# Patient Record
Sex: FEMALE | Race: WHITE | Employment: PART TIME | URBAN - METROPOLITAN AREA
[De-identification: names, ages, dates, MRNs, and addresses within clinical notes are randomized per-mention and may not be internally consistent; named-entity substitution may affect disease eponyms.]

---

## 2021-10-20 ENCOUNTER — TELEPHONE (OUTPATIENT)
Dept: SLEEP MEDICINE | Facility: CLINIC | Age: 71
End: 2021-10-20

## 2021-11-02 ENCOUNTER — VIRTUAL VISIT (OUTPATIENT)
Dept: SLEEP MEDICINE | Facility: CLINIC | Age: 71
End: 2021-11-02
Payer: MEDICARE

## 2021-11-02 DIAGNOSIS — F51.04 CHRONIC INSOMNIA: Primary | ICD-10-CM

## 2021-11-02 PROCEDURE — 90791 PSYCH DIAGNOSTIC EVALUATION: CPT | Mod: 95 | Performed by: PSYCHOLOGIST

## 2021-11-02 RX ORDER — CLOPIDOGREL BISULFATE 75 MG/1
75 TABLET ORAL DAILY
COMMUNITY

## 2021-11-02 RX ORDER — GOLIMUMAB 50 MG/4ML
SOLUTION INTRAVENOUS
COMMUNITY

## 2021-11-02 RX ORDER — OMEGA-3/DHA/EPA/FISH OIL 910-1400MG
CAPSULE ORAL
COMMUNITY

## 2021-11-02 RX ORDER — AMLODIPINE BESYLATE 5 MG/1
5 TABLET ORAL DAILY
COMMUNITY

## 2021-11-02 NOTE — PROGRESS NOTES
Are you currently in the state of [place]: Yes   Cira is a 71 year old who is being evaluated via a billable video visit.      How would you like to obtain your AVS? MyChart  If the video visit is dropped, the invitation should be resent by: Text to cell phone: [phone]  Will anyone else be joining your video visit? Trupti Todd MA    Video Start Time: 3:44 PM  Video-Visit Details    Type of service:  Video Visit    Video End Time:3:56 PM    Originating Location (pt. Location): Home    Distant Location (provider location):  [facility]     Platform used for Video Visit: [employer]     SLEEP MEDICINE CONSULTATION  Sleep Psychology    Name: Cira Boyle MRN# [id number]   Age: 71 year old YOB: 1950     Date of Consultation: Nov 2, 2021  Consultation is requested by: No referring provider defined for this encounter.  Primary care provider: Idalia Green    Reason for Sleep Consultation     Cira Boyle is a 71 year old female seen today for a behavioral sleep medicine consultation because of insomnia    Assessment and Plan     Sleep Diagnoses/Recommendations:    Chronic insomnia    Cira Boyle was seen for sleep psychology consultation and cognitive behavioral treatment of psychophysiologic insomnia.  She is a very suitable candidate for treatment.  Cooccurring medical issues appear to be generally well managed and there are clear behavioral targets for CBT-I  .  The primary intervention will be sleep compression and reduction of average time in bed from 9.5 hours to 7 hours..  This is based on sleep diary data and self-report of average sleep time of 6 hours or less.  A second area of his to address sleep specific, worry and maladaptive beliefs about sleep.  There are, there is variation in sleep offset.    Patient will initiate a 7-hour sleep window between midnight-7 AM.  She will ensure that she gets bright ambient light in the morning for at least 30-60 minutes.   She will get off of screens and devices 2 hours before bed and engage in a relaxing bedtime routine going to bed only when sleepy.  She will discontinue use of her wearable device as this appears to be increasing sleep apprehension.    Summary Counseling:      Cira was provided information about the pathophysiology of insomnia, psychophysiological factors contributing to the onset and maintenance of insomnia and how co-occurring medical conditions and intrinsic sleep disorders can affect sleep.  Treatment options were discussed including component of cognitive-behavioral therapy for insomnia as applicable to patient specific sleep concerns and symptoms. The benefits and potential early side effects of treatment including increased daytime sleepiness were discussed.     Patient was advised to consult with their prescribing provider around use of or changes to prescription sleep medication.  Patient was counseled on the importance of avoiding driving if drowsy.    See patient instructions for initial treatment recommendations and behavioral sleep plan.    Follow-up: 2 months     History of Present Sleep Complaint     Cira Boyle is a 71 year old year old female with a relevant medical history of  Chronic pain, quadruple bipass surgery, RA, GERD who presents with  A history of insomnia and non-restorative sleep.  She has had oximetry study with Dr. Julio Ji, [place] which was normal in 2020.    She has tried a number of sleep medications incuding trazodone, OTC melatonin, gabapentin,  Zaleplon, doxepin, eszopiclone and amitriptyline all within the past 4 hears.   She has used a wearable [employer] device and cites it tells her she gets only 4 hours of sleep. .    Sleep/Wake Pattern:    Shift Work - No  Source of Sleep Estimates:  verbal self-report    Time to Bed:  10:30 PM  Activity in Bed (stimulus control): none reported  Sleep Latency: 120-180 minutes  Times awakened:  2-3  Total Time Awake After Sleep Onset: 60  minutes  Time Up for the Day: 7:30 am - 8 am  Total Time in Bed: 9.5 hours  Estimated Total Sleep Time: Less than 6 hours  Sleep Efficiency Estimate: Approximately 60-70%    Naps: inadvertent naps , nap in evening    Sleep Hygiene:    Behavior affecting Sleep:  extend time in bed longer if after poor night of sleep, worry or think about sleep during the day    Environment:  Bedroom is quiet, comfortable and dark    Substance Use:  Caffeine: 1-2 beverages, does not drink caffeine within 6 hours of bedtime      Alcohol: Rare      Nicotine: None      Recreational/Illicit Drugs: None reported    Previous Sleep Studies/Consultations:    Patient did have oximetry in 2017 which was normal.  She is also been followed by the [facility] sleep center and  for management of insomnia and has been on multiple sedative-hypnotic medications.    Screening     EPWORTH SLEEPINESS SCALE    Sitting and reading 1   Watching TV 1   Sitting, inactive in a public place (theatre or mtg.) 0    As a passenger in a car 1   Lying down to rest in the afternoon when circumstance permit 1   Sitting and talking to someone 0   Sitting quietly after lunch without alcohol 0   In a car, while stopped for a few minutes in traffic 0   TOTAL SCORE (nl <11) 4     INSOMNIA SEVERITY INDEX     Difficulty falling asleep 3   Difficult staying asleep 3   Problems waking up to early 0   How SATISFIED/DISSATISFIED are you with your CURRENT sleep pattern? 4   How NOTICEABLE to others do you think your sleep pattern is in terms of your quality of life? 1   How WORRIED/DISTRESSED are you about your current sleep pattern? 4   To what extent do you consider your sleep problem to INTERFERE with your daily fuctioning(e.g. daytime fatigue, mood, ability to function at work/daily chores, concentration, mood,etc.) CURRENTLY? 1   INSOMNIA SEVERITY INDEX TOTAL SCORE 16   Absence of insomnia (0-7); sub-threshold insomnia (8-14); moderate insomnia (15-21); and severe  insomnia (22-28)    No flowsheet data found.     No flowsheet data found.       Vitals     There were no vitals taken for this visit.     Medical History     Patient Active Problem List   Diagnosis     Rheumatoid arthritis (H)     Rheumatoid arthritis, adult (H)        Current Outpatient Medications   Medication Sig Dispense Refill     acetaminophen (TYLENOL) 500 MG tablet Take by mouth daily as needed        amLODIPine (NORVASC) 5 MG tablet Take 5 mg by mouth daily       Ascorbic Acid (VITAMIN C PO) Take  by mouth.       ASPIRIN PO Take 81 mg by mouth daily.       Atorvastatin Calcium (LIPITOR PO) Take 40 mg by mouth        Calcium Carbonate-Vitamin D (CALCIUM PLUS VITAMIN D PO) Take  by mouth.       clopidogrel (PLAVIX) 75 MG tablet Take 75 mg by mouth daily       golimumab (SIMPONI ARIA) 50 MG/4ML injection every 8 weeks       Metoprolol Succinate (TOPROL XL PO) Take 12.5 mg by mouth daily        multivitamin, therapeutic with minerals (MULTI-VITAMIN) TABS Take 1 tablet by mouth daily.       Nitroglycerin (NITROSTAT SL) Place 0.4 mg under the tongue.       Omega-3 1400 MG CAPS        Pantoprazole Sodium (PROTONIX PO) Take 20 mg by mouth every morning (before breakfast)        Ranolazine (RANEXA PO) Take 500 mg by mouth 2 times daily        VITAMIN D, CHOLECALCIFEROL, PO Take  by mouth.         Past Surgical History:   Procedure Laterality Date     ARTHRODESIS ANKLE  6/20/2013    Procedure: ARTHRODESIS ANKLE;  RIGHT RHEUMATOID FOREFOOT RECONSTRUCTION WITH INTRAMEDULLARY PIN REMOVAL LEFT FOOT ();  Surgeon: Rehan Croft MD;  Location:  OR     ARTHRODESIS FOOT  5/24/2013    Procedure: ARTHRODESIS FOOT;  LEFT RHEUMATOID FOREFOOT RECONSTRUCTION   ;  Surgeon: Rehan Croft MD;  Location:  OR     BUNIONECTOMY       BYPASS GRAFT ARTERY CORONARY       CARDIAC SURGERY      angiogram, stents     GYN SURGERY      ovarian cyst removal     REMOVE HARDWARE FOOT  6/20/2013    Procedure: REMOVE HARDWARE  FOOT;;  Surgeon: Rehan Croft MD;  Location: [facility] OR          Allergies   Allergen Reactions     Rosuvastatin Other (See Comments)     MYALGIA     Erythromycin Nausea     Imdur [Isosorbide]      SEVERE HEADACHE     Levaquin      ARM PAIN AND STIFFNESS     Penicillins Rash     Ancef given with no complications     Sulfa Drugs Rash     Tizanidine Rash       Mental Health History     Prior Mental Health Diagnosis: None reported    Current Mental Health Treatment: None reported    Chemical Abuse/Treatment:  None reported      Family History     No family history on file.    Family History of Sleep Disorders: None reported    Social History     Social History     Socioeconomic History     Marital status:      Spouse name: None     Number of children: None     Years of education: None     Highest education level: None   Occupational History     None   Tobacco Use     Smoking status: Never Smoker     Smokeless tobacco: Never Used   Substance and Sexual Activity     Alcohol use: Yes     Comment: 2-3 per week     Drug use: No     Sexual activity: None   Other Topics Concern     None   Social History Narrative     None     Social Determinants of Health     Financial Resource Strain:      Difficulty of Paying Living Expenses:    Food Insecurity:      Worried About Running Out of Food in the Last Year:      Ran Out of Food in the Last Year:    Transportation Needs:      Lack of Transportation (Medical):      Lack of Transportation (Non-Medical):    Physical Activity:      Days of Exercise per Week:      Minutes of Exercise per Session:    Stress:      Feeling of Stress :    Social Connections:      Frequency of Communication with Friends and Family:      Frequency of Social Gatherings with Friends and Family:      Attends [employer] Services:      Active Member of Clubs or Organizations:      Attends Club or Organization Meetings:      Marital Status:    Intimate Partner Violence:      Fear of Current or Ex-Partner:       Emotionally Abused:      Physically Abused:      Sexually Abused:        Patient is retired, lives with      Mental Status Examination     Cira presented as oriented X3 with speech and language intact.  The patient was cooperative throughout the evaluation with no signs of hallucinations, delusions, loosening of associations or other thought disturbance.  Mood was normal Affect was congruent with mood. Insight and judgement were intact.  Memory appeared intact for recent and remote elements.  There was no report of suicidal ideation, intention or plan. Attention and concentration were within normal.      Copy:   Idalia Green               No referring provider defined for this encounter.    Brennan Echevarria PsyD, LP, [facility]  Diplomate, Behavioral Sleep Medicine  [facility]    Note: This dictation was created using voice recognition software. This document may contain an error not identified before finalizing the document. If the error changes the accuracy of the document, I would appreciate it being brought to my attention.

## 2021-11-02 NOTE — PATIENT INSTRUCTIONS
CBT-I Introductory Module    Understanding Sleep and Insomnia    Most people have trouble sleeping at some point in their life.   Chronic insomnia means you have had trouble falling asleep and/or staying asleep for at least the past three months.  Despite allowing enough time for sleep, it is affecting how you feel. You are not alone.  It is estimated that 10-15% of adults experience chronic insomnia.     Cognitive Behavioral Therapy for Insomnia (CBT-I)    Consistent with the guidelines of the [employer], [facility] recommends  Cognitive Behavioral Therapy for Insomnia (CBT-I) as the first-line treatment for insomnia.  CBT-I targets factors that lead to long-term insomnia:    ? Behavioral Conditioning    When you lay awake in bed over many nights, your body actually becomes trained or 'conditioned' to be awake during the night.  It makes the bed associated with alertness instead of sleepiness.    ? Habits that weaken your body's sleep drive and circadian sleep rhythm    Changing sleep schedules, extended time in bed, using mobile devices and computers close to bedtime, and naps too late in the day can harm your sleep at night.    ? Emotional and Physical Arousal    Things such as worrying about sleep, stress, drinking too much caffeine, and not winding down before bed can interfere with your body's natural sleep drive.    Understanding Sleep and Insomnia    [facility] CBT-I is a four-part program that teaches you the skills needed for a better night's sleep. The first step in your program is learning a bit about sleep and insomnia.      The Basics of Sleep    How does sleep help us?    Sleep, like food and water, is something we need every day. The purpose of sleep is still not exactly clear, but sleep experts agree we need consistent quality sleep to function at our best. There is evidence that sleep helps maintain brain and body functions.  It helps maintain thinking  ability and mood.  Sleep is actually a very active part of life. Sleep occurs in four stages that cycle every  minutes throughout the night. We get our deepest sleep during the first few hours of sleep.  During the last half of our sleep, we usually get the bulk of our REM (Rapid Eye Movement) sleep.  REM sleep is when most of our dreaming occurs.    How much sleep do we need?    Sleep needs vary from person to person. Most adults need between 6-8 hours of sleep.  Sleeping too little or too much may be a health risk.  As we age, most people report their sleep gets lighter, earlier, shorter, and more restless.     What Controls Our Sleep?    The three things that regulate your sleep are your sleep drive, biological clock and your arousal system (emotional and physical).  Together these make us feel alert during the day and promote sleep at night.    Your sleep drive depends on how long you have been awake. It is lowest when you first wake up.  Sleep drive gradually increases as the day goes on.  The longer time you are awake the easier it is to fall asleep.  Sleeping gradually reduces your sleep drive. That is why napping in the evening or close to bed can make it harder to sleep at night.    Your biological clock promotes wakefulness during the day and sleep at night.        Figure 1 two process sleep model (source: 1. Quita J, Daly R, Jaden T, et al. Future research directions in sleep and ADHD: report of a consensus working group. J Atten Disord. 2013;17(7):550-564. Doi:10.1177/[id number])    Understanding Insomnia    What causes insomnia?    Some people have a greater likelihood of developing insomnia due to genetics, personality or age. A host of things can trigger it: jet lag, working a different shift, medication, or the onset of a medical or mental health condition.             Insomnia and Your Arousal System    Mental activity, emotions and physical symptoms can make your brain too active to  "sleep by masking the strength of your sleep drive. Your brain's arousal system not only triggers insomnia, it plays a role in maintaining it as well.  Common sources of arousal include:    ? Worry about sleep in bed  ? An active mind concerned about unfinished tasks  ? Anxiety, Stress and Depression  ? Pain     What maintains insomnia?    Short-term insomnia can become chronic when you begin to feel fearful, worried and  on guard  about sleep loss. As you spend more time in bed or try forcing yourself to sleep your bed becomes linked with wakefulness.  This is why people with insomnia commonly report feeling tired before bed but suddenly more alert when getting into bed.  This type of  conditioning  along with unhelpful sleep habits maintains the insomnia even when the triggering event has resolved.    Tracking your Sleep    Sleep tracking is an essential part of training yourself to sleep better and monitoring your progress.  There are several ways to keep track of your sleep habits.    CBT-I  Dustin    The CBT-i  dustin is a convenient way to track your sleep on your mobile phone. We strongly recommend using it if you have a mobile phone. After downloading the free dustin, simply go to My Sleep >Sleep Diary > Add New Entry and record your entry for the day. Your entries should be based on your recollection of the past night of sleep. The dustin graphs your information and has helpful reminders. If you are working with a provider go to Settings and turn on  Working with a CBT-I Provider.   You can also e-mail a spreadsheet of your data to yourself by pressing \"[facility] User Data\" in the dustin settings. .Make sure to have your data available at the time of your first visit.      [employer] Sleep Diary     [employer] [place] has an easy to use sleep diary which includes an example of how to complete it every morning after you get up for the day. Do not watch the clock at night.  Your daily diary entry is your recollection of " your last night of sleep.       Mobile and Wearable Sleep Tracking Devices    There are many sleep tracking devices and mobile applications that estimate the timing and quantity of sleep by measuring body movement.  Though many of these devices claim to measure the depth or stages of sleep, they cannot measure brain wave activity or other measures required to determine the actual stages of sleep.  They are helpful in estimating the timing and total amount of time you sleep.    CBT-I and Sleeping Pills    Sleep medications can be helpful in the short-term but often stop working in the long-term.  Sleeping pills treat symptoms and not the underlying cause of insomnia.  They also can have side effects that last well into the day. Abruptly stopping sleeping pills can cause temporary rebound insomnia and lead to increased distress about sleeplessness.  This in turn strengthens the belief that pills are necessary for sleep.    Many patients choose to discontinue sleeping pills prior to beginning CBT-I.  You should talk to your prescribing provider before tapering or discontinuing sleep medication.            CBT-I Module - Sleep Consolidation Training      Now that you understand a bit more about how sleep works and what causes insomnia, you are ready to move on to the core of CBT-I called Sleep Consolidation Training. It will be important that you continue to keep track of your sleep using your CBTi  Dustin or your paper sleep diary.  This process involves five core strategies that will:    ? Strengthen your sleep drive and circadian sleep rhythm  ? Strengthen the link between your bed and sleep    To prepare for Sleep Consolidation Training, we recommend you make the following changes to set the stage for a better night's sleep:    ? Reduce your consumption of caffeine and alcohol.  Both can disrupt sleep and make strengthening your sleep more difficult.  Specifically:    - Avoid caffeine within 6 hours of bedtime   -  No more than 3 caffeinated beverages per day (e.g. 8 oz. cup coffee or 12 oz. cup soda)           - No alcohol within 3 hours of bedtime    ? Make sure your bedroom is quiet, comfortable and dark.  Noise, light and an uncomfortable sleep space can harm your sleep.      Core Sleep Consolidation Strategies    Much like physical activity and eating nutritious foods strengthens our body, studies show that the following core strategies are the key to achieving stronger, healthier sleep.     1. Reduce your Time In Bed    Spending extra time in bed trying to get more sleep actually can cause more sleep disruption and weaken sleep efficiency. Sleep efficiency is simply the percentage of time a person spends asleep while in bed. Normal sleep efficiency is thought to be 85% or greater.  By reducing your time in bed, you will be awake longer leading to quicker and deeper sleep. This strategy does not reduce the amount of sleep you get, just the time you are awake in bed:                                             During the first step of sleep consolidation training you will reduce your time in bed and maintain the following Sleep Schedule Prescription. Your prescription is determined by the average nightly amount of sleep you got over the past two weeks plus 30 minutes. It also takes into account the best time for you to sleep. The least amount of time prescribed is 6 hours in bed.    Your Sleep Schedule Prescription                    Total Time in Bed:  7 hours                  Bedtime:  No earlier than Midnight                   Wake-up Time:  Out of bed every day by 7 am with alarm and get exposure to bright morning light or ambient light for at least 30 minutes.     2.  Don't go to bed until you feel sleepy (not tired or fatigued)     This helps increase your sleep drive by keeping you awake longer.  If you go to bed when you're not sleepy, it gives your brain the wrong message and can lead to frustration.     If you  feel sleepy before your prescribed bedtime, find activities that can help you stay awake until it is time for you to go to bed. Even brief naps in the evening can be very disruptive of sleep at night.     3.  Don't stay in bed unless you are sleeping      If you are unable to fall asleep or return to sleep after about 30 minutes, get out of bed. This helps train your brain that the bed is for sleep. It is harmful to your sleep when you are worried or frustrated in bed. Do not read, eat, worry, think about sleep, use mobile phones or tablets, or watch TV in bed. Do not watch the clock during the night.     Go to another room and do something relaxing. Plan things you can do when you get out of bed. Avoid use of mobile devices or computers when out of bed.    Return to bed only when you feel sleepy again.  Repeat as often as needed throughout the night.     4.  Get out of bed at the same time every day    Getting up at the same time helps set your biological clock. It is important to stick to your wake time no matter how much sleep you got the night before or how you feel in the morning.    Varying your wake can have the same effect as jet lag.  It disrupts your biological clock and makes you feel more tired and exhausted.  Trying to  catch up  on sleep on the weekends only makes things worse and sets you up for a cycle of poor sleep the following weekdays.      Make sure you set an alarm and keep it away from the bed to prevent looking at the clock during the night.      5.  Avoid daytime napping    Avoid daytime napping if possible. Napping partially fulfills your need for sleep and weakens your sleep drive at night.    However, if you find yourself sleepy (not just tired) you can take a brief 15-30 minute nap during the day if needed.  Naps within 7-8 hours of your prescribed wake time are less likely to affect your sleep the coming night.  Sleepy people make more mistakes and may hurt themselves or others.  Therefore, safety trumps all other sleep prescriptions.  Never drive or operate equipment if drowsy or sleepy.     Changing Your Sleep Schedule Prescription    After two weeks, you can adjust your sleep schedule prescription based on how well you are sleeping. Use the following guidelines to change your prescribed time in bed based on information from your Sleep Diary, Mobile Sleep Diary Dustin or Wearable Device:          Increase Time in Bed by 15 Minutes IF:    ? Your CBT-I  dustin indicates your sleep efficiency has been greater than 90%.    ? OR you are falling asleep in less than 30 minutes AND awake less than 30 minutes during the night.              Decrease Time in Bed by 15 Minutes IF:    ? Your CBT-I  Dustin indicates your sleep efficiency has been less than 80%.    ? OR it is taking longer than 30 minutes to fall asleep OR you are awake more than 30 minutes during the night.      ? DO NOT decrease your sleep schedule below 6 hours unless instructed by your provider.    Change is Challenging    Research shows that making significant changes in sleep habits improves sleep quality and how you feel. Improvement takes time and is not always steady. Change is challenging and can be stressful.  This is especially true if old habits - like spending more time in bed -- were a way to avoid worry about getting enough sleep.           CBT-I Supplementary Module - Cognitive Training    Developing Healthy Sleep Thoughts    Insomnia is often triggered by stressful events such as a change in employment, a separation, medical illness, or loss.  How you handle your sleep problem, mainly your thoughts and behaviors, determines in large part whether your sleeplessness is short term or develops into chronic insomnia well after the stressful event is over.     This  part of your program involves learning a set of skills to change negative and worrisome thoughts about sleep.   Insomnia is more likely to persist if you  "interpret the onset as a threat or loss of control.  Worry, fears and untrue beliefs about insomnia can become a vicious cycle.  Negative sleep thoughts activate the physical and emotional systems of your body.  This strengthens wakefulness and weakens your sleep system.  This in turn produces increased stress and pressure to sleep or undo \"sleep effort.\"    Changing How You Think About Insomnia    We now know that our thoughts and attitudes affect our stress response.  Negative sleep thoughts can worsen your insomnia. They can lead to greater fear or anxiety about sleep, which in turn can aggravate your sleep problem. Thinking more positively and realistically about your sleep promotes its healing.     Myths about Sleep    ? People need 8 hours of sleep     This is untrue.  Sleep needs vary from person to person.  Most people need between 6-8 hours of sleep to feel alert during the day.  People who sleep 7 hours live longer than those who sleep 8 hours.  Research also suggests people who sleep 5 hours live longer than those who sleep 9 hours    ? Insomnia Causes Dementia        While there has been a great deal of research exploring the causes of dementia, there is      no conclusive evidence that insomnia causes dementia.  We do know that the rate of       Insomnia is higher in a host of health conditions that have been associated with increased      risk of certain types of dementia.  In general, people with primary insomnia perform similar      To normal sleepers in tests of neurocognitive ability.    ? Insomnia is the same as sleep deprivation    Sleep deprivation is lack of sleep due habits and circumstanced that prevent enough time for sleep.  This is typically not true for insomnia where people have enough time for sleep and even extend their sleep time trying to get more sleep.  Most people with insomnia get about the same amount of sleep as normal sleepers.  The difference is that with insomnia the sleep " is fragmented and less consolidated.       You are Getting More Sleep than You Think    Research using objective sleep tests reveal that people with insomnia get an average of one hour more sleep than they think. This is due in part because the brain misperceives Stage 1 and 2 sleep as being awake.  In addition, stress and arousal while awake in bed changes the brain's perception of time awake.    Examples of Unhealthy Sleep Thoughts    Negative sleep thoughts can have a profound impact on your ability to get a good night's sleep. Below are some examples of negative thoughts associated with insomnia that may sound familiar to you:    ? I must get 8 hours of sleep to function during the day.  ? I shouldn't wake up at all in the middle of the night.  ? Insomnia is going to cause health problems.  ? I am dreading going to bed.  ? I woke up early again.  I know I won't get back to sleep.  ? The reason I feel terrible today is because of my insomnia.  ? I've totally lost control of my sleep.  ? I can't sleep without a sleeping pill.    Negative thoughts usually occur automatically and feel like a knee-jerk reaction.  They are often untrue or distorted, especially late in the evening as you become increasingly tired and others are asleep.      Changing Unhealthy Sleep Thoughts    Now that you understand the impact negative thoughts can have on your sleep, you are ready to change these unhelpful thoughts.  The strategy is powerful and simple:  By recognizing and replacing your negative thoughts about sleep with more accurate, positive thoughts, you will be less anxious and frustrated about your sleep. A more realistic and positive attitude about sleep will allow you to relax and sleep more easily through the night.           There are several important steps involved in changing your unhelpful and negative thoughts about sleep:            Examples of Healthy Sleep Thoughts    ? My work will not suffer much if I have a poor  night's sleep.    ? I'm probably getting more sleep than I think.    ? Other things than my sleep affect my daytime functioning.    ? Because I didn't sleep well last night, I am more likely to sleep well tonight because of increased sleep drive that leads to deeper sleep.    ? Sleep requirements vary from one person to another.    ? If I don't sleep well, most of the time the worst that can happen is I may feel a bit more tired later in the day or my mood might not be as bright.    ? If I awaken after about 4-5 hours of sleep, I have gotten the core sleep I need for the day.    ? I'm more likely to fall asleep the longer I've been awake    ? I'm more likely to fall asleep as my body temperature begins to decrease through the night.    ? My body's wakefulness system takes charge during the day to promote daytime functioning.    ? These sleep skills have worked for others, and they can work for me.    Other Recommendations for Changing Beliefs and Attitudes   About Your Sleep    ? Keep Realistic Expectations     There is a widespread belief that 8 hours of sleep is necessary to feel refreshed and function well during the day. Many believe that good sleep means never waking up at night.  Others come to expect that they should always wake up in the morning feeling full of energy.  Concerns may arise when your actual sleep falls short of these expectations. Try to avoid placing undue pressure on yourself to achieve certain sleep levels.  It only increases anxiety about sleeping.  Focus on quality sleep not quantity of sleep.    ? Revise Your Thoughts about the Causes of Insomnia      There is a natural tendency to attribute our sleep problems completely to external factors such as a chemical imbalance, pain, aging or things over which we may have little control.  Although these factors may contribute to your insomnia, research shows CBT-I is beneficial even if they are present.    ? Don't Blame Insomnia for All Daytime  Impairments      Many individuals blame insomnia for every symptom or concern they experience during the day from fatigue to lack of concentration. Though poor sleep may produce some of these symptoms, it us usually untrue that all daytime impairment is attributable to insomnia.  It is more often that other factors such as stress and co-occurring medical problems contribute more to how you feel during the day.      ? Don't Catastrophize      Catastrophic thinking means making a sleep mountain out of a molehill.  Some people believe poor sleep will have catastrophic consequences to their physical health, mental health and appearance. Others see insomnia as a complete loss of control.  These perceived consequences of insomnia often prompt people to seek medication or other treatment.  Keep in mind insomnia can be very unpleasant but for the most part is not dangerous.    ? Don't Focus on Sleep     Some people reduce their activity level because of poor sleep.  Although sleep is a necessary part of life, don't make it the focus of your thoughts and concern. Trust that if you engage in health sleep habits, your body will give you the sleep it needs.  Make sure you continue your normal activities despite your insomnia.    ? Never Try to Sleep      Of all the habits the most important one is this:  Never try to force yourself to sleep.  Doing so usually backfires and makes things worse. Instead, focus on keeping to your prescribed sleep schedule, getting up at the same time every day and using the bed only for sleep.    What are Your Three Top Negative Thoughts About Your Sleep?    Negative Thought                        Resulting Feeling                   Alternate Thought  I must get 8 hours of sleep                   Fear                      People need differing amounts of sleep and   every night                                                                         sleep time varies somewhat each  night    1.__________________________________________________________________________________________    2.__________________________________________________________________________________________    3.__________________________________________________________________________________________

## 2021-11-07 ENCOUNTER — HEALTH MAINTENANCE LETTER (OUTPATIENT)
Age: 71
End: 2021-11-07

## 2022-01-27 ENCOUNTER — VIRTUAL VISIT (OUTPATIENT)
Dept: SLEEP MEDICINE | Facility: CLINIC | Age: 72
End: 2022-01-27
Payer: MEDICARE

## 2022-01-27 VITALS — BODY MASS INDEX: 24.66 KG/M2 | HEIGHT: 65 IN | WEIGHT: 148 LBS

## 2022-01-27 DIAGNOSIS — F51.04 CHRONIC INSOMNIA: Primary | ICD-10-CM

## 2022-01-27 PROCEDURE — 90832 PSYTX W PT 30 MINUTES: CPT | Mod: 95 | Performed by: PSYCHOLOGIST

## 2022-01-27 ASSESSMENT — SLEEP AND FATIGUE QUESTIONNAIRES
HOW LIKELY ARE YOU TO NOD OFF OR FALL ASLEEP WHILE WATCHING TV: SLIGHT CHANCE OF DOZING
HOW LIKELY ARE YOU TO NOD OFF OR FALL ASLEEP WHILE SITTING INACTIVE IN A PUBLIC PLACE: WOULD NEVER DOZE
HOW LIKELY ARE YOU TO NOD OFF OR FALL ASLEEP WHILE SITTING AND READING: WOULD NEVER DOZE
HOW LIKELY ARE YOU TO NOD OFF OR FALL ASLEEP WHILE LYING DOWN TO REST IN THE AFTERNOON WHEN CIRCUMSTANCES PERMIT: SLIGHT CHANCE OF DOZING
HOW LIKELY ARE YOU TO NOD OFF OR FALL ASLEEP WHILE SITTING QUIETLY AFTER LUNCH WITHOUT ALCOHOL: SLIGHT CHANCE OF DOZING
HOW LIKELY ARE YOU TO NOD OFF OR FALL ASLEEP WHILE SITTING AND TALKING TO SOMEONE: WOULD NEVER DOZE
HOW LIKELY ARE YOU TO NOD OFF OR FALL ASLEEP IN A CAR, WHILE STOPPED FOR A FEW MINUTES IN TRAFFIC: WOULD NEVER DOZE
HOW LIKELY ARE YOU TO NOD OFF OR FALL ASLEEP WHEN YOU ARE A PASSENGER IN A CAR FOR AN HOUR WITHOUT A BREAK: SLIGHT CHANCE OF DOZING

## 2022-01-27 ASSESSMENT — MIFFLIN-ST. JEOR: SCORE: 1192.2

## 2022-01-27 NOTE — PROGRESS NOTES
Cira is a 71 year old who is being evaluated via a billable video visit.      How would you like to obtain your AVS? MyChart  If the video visit is dropped, the invitation should be resent by: Text to cell phone: [phone]  Will anyone else be joining your video visit? No      will be there for tech support.      Video Start Time: 4:09 PM  Video-Visit Details    Type of service:  Video Visit    Video End Time: 4: 30 p.m.    Originating Location (pt. Location): Home    Distant Location (provider location):  [facility]     Platform used for Video Visit: [facility]     SLEEP [facility] VIRTUAL VIDEO FOLLOW-UP VISIT  Sleep Psychology    Patient Name: Cira Boyle  MRN:  [id number]  Date of Service: Jan 27, 2022       Subjective Report     Cira Boyle  returns for a telehealth video visit to discuss progress in implementing behavioral strategies for the management of insomnia.  Patient consent for initiation of video visit was obtained and documented prior to initiation of visit.     Cira reports she is doing better overall with sleep.  She has reduced her dozing in the evenings.   She reports adhering to stimulus control.    She has adjusted her sleep schedule to 10:30 and is waking about 7 am.  She has occasional nights where she has increased WASO and associates this with stressors such as anticipating surgery or a family concern.    Discussed anxiety and need for control.     .     Sleep Data:     Source of Sleep Estimates:  verbal self-report        EPWORTH SLEEPINESS SCALE    Sitting and reading 0   Watching TV 1   Sitting, inactive in a public place (theatre or mtg.) 0    As a passenger in a car 1   Lying down to rest in the afternoon when circumstance permit 1   Sitting and talking to someone 0   Sitting quietly after lunch without alcohol 1   In a car, while stopped for a few minutes in traffic 0   TOTAL SCORE (nl <11) 4     INSOMNIA SEVERITY INDEX     Difficulty falling  "asleep 1   Difficult staying asleep 1   Problems waking up to early 1   How SATISFIED/DISSATISFIED are you with your CURRENT sleep pattern? 2   How NOTICEABLE to others do you think your sleep pattern is in terms of your quality of life? 0   How WORRIED/DISTRESSED are you about your current sleep pattern? 1   To what extent do you consider your sleep problem to INTERFERE with your daily fuctioning(e.g. daytime fatigue, mood, ability to function at work/daily chores, concentration, mood,etc.) CURRENTLY? 0   INSOMNIA SEVERITY INDEX TOTAL SCORE 6    Absence of insomnia (0-7); sub-threshold insomnia (8-14); moderate insomnia (15-21); and severe insomnia (22-28)       Interventions     Strategies and recommendations including implementation and maintenance of of stimulus control and implementation of sleep compression were discussed today.       Vital Signs     Ht 1.651 m (5' 5\")   Wt 67.1 kg (148 lb)   BMI 24.63 kg/m       Mental Status     Orientation:  X3  Mood:  normal  Affect:  Congruent with mood  Speech/Language:  Normal  Thought Process: Intact  Associations:  Normal  Thought Content: Normal  Patient does not report any suicidal ideation, intention or plan.    Diagnostic Impressions and Plan     Chronic insomnia    Patient is expressed significant provement in overall sleep quality with good adherence to stimulus control and moderate sleep compression. We reviewed the rationale for adherence to compressed sleep schedule and importantly avoidance of inadvertent napping in the evening.    Plan:  Continue current sleep schedule and plan    Follow-up: 2 months      Brennan Echevarria PsyD, BRANT, DBSM  Diplomate, Behavioral Sleep Medicine  [facility]      Note: This dictation was created using voice recognition software. This document may contain an error not identified before finalizing the document. If the error changes the accuracy of the document, I would appreciate it being brought to " my attention.

## 2022-01-27 NOTE — NURSING NOTE
Pt stated she is allergic to Crestor.    Pt stated she is allergic to Definity - Dye.    Katelin DUONG

## 2022-01-27 NOTE — PATIENT INSTRUCTIONS
CBT-I Supplementary Module - Relaxation Training    Relaxation and Sleep    Winding Down Time    A wind-down time before you go to bed can help you relax your mind and body. Ways to help transition from a busy day to bedtime include things like:    ? Listening to calm music  ? Reading  ? Watching a pleasant or relaxing TV show  ? Taking a bath    We recommend you set a reminder to begin your wind down time one hour before bedtime.  Many mobile sleep apps such as CBT-I  have wind-down time reminders.    Relaxation and Sleep    Research shows relaxation training can reduce the time it takes to fall asleep and improve sleep quality.  Relaxation training works by reducing physical, emotional and mental arousal that can interfere with your sleep.     Relaxation skills are easy to learn on your own and are widely available free through mobile apps or online.  We recommend doing some form of relaxation exercise daily for at least 10 minutes.  This can include short breathing exercises used throughout the day to help manage stress.    If you are using the CBT-I  mobile baltazar you will find the following relaxation exercises in the Tools section including Breathing Tools, Progressive Muscle Relaxation and Mindfulness Exercise.    Insight Timer, Calm, and Headspace are three examples of well-reviewed mobile apps that offer free, for-purchase, and subscription guided relaxation exercises and meditations.    You Tube is a great resource to learn a range of techniques from progressive muscle relaxation to guided imagery.      Do not use guided relaxation tapes while driving or operating equipment.      Managing Worry before Bed    All human beings worry.  Uncontrolled worry can affect your sleep and health by activating your arousal system.  Worry makes your brain, body and heart behave like there is a danger or threat.  This stress response can make it more difficult to fall asleep and stay asleep. The most common  types of worry include:    ? Concerned about unfinished tasks  ? Concern over family, finances or work  ? Worry about things beyond your control    Scheduling Constructive Worry Time    The part of our brain that plans, sorts, and helps manage our emotions is less effective in doing its job as nighttime comes.  Without the usual distractions of the day, we tend to worry more and have trouble putting aside our worries.    A simple technique called Constructive Worry Time can help to reduce and manage worry as you approach bedtime or in the middle of the night. It is most effective when practiced daily.

## 2022-03-31 ENCOUNTER — VIRTUAL VISIT (OUTPATIENT)
Dept: SLEEP MEDICINE | Facility: CLINIC | Age: 72
End: 2022-03-31
Payer: MEDICARE

## 2022-03-31 VITALS — BODY MASS INDEX: 24.49 KG/M2 | WEIGHT: 147 LBS | HEIGHT: 65 IN

## 2022-03-31 DIAGNOSIS — F51.04 CHRONIC INSOMNIA: Primary | ICD-10-CM

## 2022-03-31 PROCEDURE — 90832 PSYTX W PT 30 MINUTES: CPT | Mod: 95 | Performed by: PSYCHOLOGIST

## 2022-03-31 ASSESSMENT — SLEEP AND FATIGUE QUESTIONNAIRES
HOW LIKELY ARE YOU TO NOD OFF OR FALL ASLEEP WHILE SITTING INACTIVE IN A PUBLIC PLACE: WOULD NEVER DOZE
HOW LIKELY ARE YOU TO NOD OFF OR FALL ASLEEP WHILE SITTING AND READING: SLIGHT CHANCE OF DOZING
HOW LIKELY ARE YOU TO NOD OFF OR FALL ASLEEP WHILE SITTING QUIETLY AFTER LUNCH WITHOUT ALCOHOL: SLIGHT CHANCE OF DOZING
HOW LIKELY ARE YOU TO NOD OFF OR FALL ASLEEP IN A CAR, WHILE STOPPED FOR A FEW MINUTES IN TRAFFIC: WOULD NEVER DOZE
HOW LIKELY ARE YOU TO NOD OFF OR FALL ASLEEP WHEN YOU ARE A PASSENGER IN A CAR FOR AN HOUR WITHOUT A BREAK: SLIGHT CHANCE OF DOZING
HOW LIKELY ARE YOU TO NOD OFF OR FALL ASLEEP WHILE LYING DOWN TO REST IN THE AFTERNOON WHEN CIRCUMSTANCES PERMIT: SLIGHT CHANCE OF DOZING
HOW LIKELY ARE YOU TO NOD OFF OR FALL ASLEEP WHILE WATCHING TV: SLIGHT CHANCE OF DOZING
HOW LIKELY ARE YOU TO NOD OFF OR FALL ASLEEP WHILE SITTING AND TALKING TO SOMEONE: WOULD NEVER DOZE

## 2022-03-31 ASSESSMENT — PAIN SCALES - GENERAL: PAINLEVEL: SEVERE PAIN (6)

## 2022-03-31 NOTE — PROGRESS NOTES
Cira is a 71 year old who is being evaluated via a billable video visit.      How would you like to obtain your AVS? MyChart  If the video visit is dropped, the invitation should be resent by: Text to cell phone: [phone]  Will anyone else be joining your video visit? Trupti  Dejon Denney    Video Start Time: 2:33 PM  Video-Visit Details    Type of service:  Video Visit    Video End Time:3:06 PM    Originating Location (pt. Location): Home    Distant Location (provider location):  [facility]     Platform used for Video Visit:  [facility]    SLEEP MEDICINE VIRTUAL VIDEO FOLLOW-UP VISIT  Sleep Psychology    Patient Name: Cira Boyle  MRN:  [id number]  Date of Service: Mar 31, 2022       Subjective Report     Cira Boyle  returns for a telehealth video visit to discuss progress in implementing behavioral strategies for the management of insomnia.  Patient consent for initiation of video visit was obtained and documented prior to initiation of visit.     Cira reports She has experienced significant improvement in overall sleep quality.  She recently has had emergence of neck pain which she feels may be affecting sleep recently.  She is seeking active treatment for this including PT.        .     .     Sleep Data:     Source of Sleep Estimates:  Verbal Self-report    Average Time in Bed: 7.75  Average Total Sleep Time:  7 hrs  Sleep Efficiency: 90% but reduction to about 80% with onset of neck pain    EPWORTH SLEEPINESS SCALE    Sitting and reading 1   Watching TV 1   Sitting, inactive in a public place (theatre or mtg.) 0    As a passenger in a car 1   Lying down to rest in the afternoon when circumstance permit 1   Sitting and talking to someone 0   Sitting quietly after lunch without alcohol 1   In a car, while stopped for a few minutes in traffic 0   TOTAL SCORE (nl <11) 5     INSOMNIA SEVERITY INDEX     Difficulty falling asleep 1   Difficult staying asleep 2   Problems waking up to  "early 1   How SATISFIED/DISSATISFIED are you with your CURRENT sleep pattern? 2   How NOTICEABLE to others do you think your sleep pattern is in terms of your quality of life? 1   How WORRIED/DISTRESSED are you about your current sleep pattern? 1   To what extent do you consider your sleep problem to INTERFERE with your daily fuctioning(e.g. daytime fatigue, mood, ability to function at work/daily chores, concentration, mood,etc.) CURRENTLY? 1   INSOMNIA SEVERITY INDEX TOTAL SCORE 9    Absence of insomnia (0-7); sub-threshold insomnia (8-14); moderate insomnia (15-21); and severe insomnia (22-28)       Interventions     Strategies and recommendations including Stimulus control therapy and sleep and chronic pain were discussed today.       Vital Signs     Ht 1.651 m (5' 5\")   Wt 66.7 kg (147 lb)   BMI 24.46 kg/m       Mental Status     Orientation:  X3  Mood:  normal  Affect:  Congruent with mood  Speech/Language:  Normal  Thought Process: Intact  Associations:  Normal  Thought Content: Normal  Patient does not report any suicidal ideation, intention or plan.    Diagnostic Impressions and Plan     Chronic insomnia    Cira Boyle has responded very well to stimulus control therapy with improvement in overall sleep quality and reduction of insomnia to the normal range.  However in recent days chronic neck pain has increased sleep fragmentation and so her insomnia severity is in the subclinical range.  Patient was advised to reduce time in bed to her original prescription of 7 hours and then adding 30 minutes for a total time in bed of 7.5 hours.  We discussed the importance of having appropriate pillow and neck support when experiencing neck pain during sleep.    Plan:  Continue current sleep schedule and plan    Follow-up: as needed      Brennan Echevarria PsyD, BRANT, DBSM  Diplomate, Behavioral Sleep Medicine  [facility]      Note: This dictation was created using voice recognition " software. This document may contain an error not identified before finalizing the document. If the error changes the accuracy of the document, I would appreciate it being brought to my attention.

## 2022-04-01 ENCOUNTER — MYC MEDICAL ADVICE (OUTPATIENT)
Dept: SLEEP MEDICINE | Facility: CLINIC | Age: 72
End: 2022-04-01
Payer: MEDICARE

## 2022-04-04 NOTE — TELEPHONE ENCOUNTER
Writer has sent a message back to patient stating to call the Medical records number for any records of office visit notes. No further actions needed.

## 2022-11-19 ENCOUNTER — HEALTH MAINTENANCE LETTER (OUTPATIENT)
Age: 72
End: 2022-11-19

## 2022-12-17 ENCOUNTER — HEALTH MAINTENANCE LETTER (OUTPATIENT)
Age: 72
End: 2022-12-17

## 2023-11-18 ENCOUNTER — HEALTH MAINTENANCE LETTER (OUTPATIENT)
Age: 73
End: 2023-11-18

## 2024-01-27 ENCOUNTER — HEALTH MAINTENANCE LETTER (OUTPATIENT)
Age: 74
End: 2024-01-27

## 2025-02-01 ENCOUNTER — HEALTH MAINTENANCE LETTER (OUTPATIENT)
Age: 75
End: 2025-02-01